# Patient Record
Sex: FEMALE | Race: AMERICAN INDIAN OR ALASKA NATIVE
[De-identification: names, ages, dates, MRNs, and addresses within clinical notes are randomized per-mention and may not be internally consistent; named-entity substitution may affect disease eponyms.]

---

## 2017-08-22 ENCOUNTER — HOSPITAL ENCOUNTER (EMERGENCY)
Dept: HOSPITAL 42 - ED | Age: 42
Discharge: HOME | End: 2017-08-22
Payer: COMMERCIAL

## 2017-08-22 VITALS
OXYGEN SATURATION: 98 % | SYSTOLIC BLOOD PRESSURE: 125 MMHG | RESPIRATION RATE: 19 BRPM | DIASTOLIC BLOOD PRESSURE: 85 MMHG | HEART RATE: 82 BPM | TEMPERATURE: 98.5 F

## 2017-08-22 VITALS — BODY MASS INDEX: 27.4 KG/M2

## 2017-08-22 DIAGNOSIS — K08.89: ICD-10-CM

## 2017-08-22 DIAGNOSIS — K02.9: Primary | ICD-10-CM

## 2017-08-22 PROCEDURE — 96372 THER/PROPH/DIAG INJ SC/IM: CPT

## 2017-08-22 PROCEDURE — 99283 EMERGENCY DEPT VISIT LOW MDM: CPT

## 2017-08-22 NOTE — ED PDOC
Arrival/HPI





- General


Chief Complaint: Dental Pain


Time Seen by Provider: 08/22/17 11:31


Historian: Patient





- History of Present Illness


Narrative History of Present Illness (Text): 





08/22/17 11:32


43 y/o female, no significant pmh, allergic to tylenol and tramadol?, c/o rt. 

lower molar pain x 2 days.  Aching pain, aggravated by chewing and drinking 

cold water, aching pain, no facial swelling, no fever or chills, no neck pain, 

no difficulty turning the neck, no other medical or psychological complaints. 





Past Medical History





- Provider Review


Nursing Documentation Reviewed: Yes





- Infectious Disease


Hx of Infectious Diseases: None





- Cardiac


Hx Cardiac Disorders: No





- Pulmonary


Hx Respiratory Disorders: No





- Neurological


Hx Neurological Disorder: No





- HEENT


Hx HEENT Disorder: No





- Renal


Hx Renal Disorder: No





- Endocrine/Metabolic


Hx Endocrine Disorders: Yes


Hx Hyperthyroidism: Yes





- Hematological/Oncological


Hx Blood Disorders: Yes


Hx Anemia: Yes


Hx Blood Transfusions: No





- Integumentary


Hx Dermatological Disorder: No





- Musculoskeletal/Rheumatological


Hx Musculoskeletal Disorders: Yes





- Gastrointestinal


Hx Gastrointestinal Disorders: No





- Genitourinary/Gynecological


Hx Genitourinary Disorders: No





- Psychiatric


Hx Psychophysiologic Disorder: No


Hx Anxiety: No


Hx Bipolar Disorder: No


Hx Depression: No


Hx Emotional Abuse: No


Hx Hallucinations: No


Hx Panic Disorder: No


Hx Post Traumatic Stress Disorder: No


Hx Psychosis: No


Hx Physical Abuse: No


Hx Schizophrenia: No


Hx Sexual Abuse: No


Hx Substance Use: No





- Anesthesia


Hx Anesthesia: No


Hx Anesthesia Reactions: No


Hx Malignant Hyperthermia: No





Family/Social History





- Physician Review


Nursing Documentation Reviewed: Yes


Family/Social History: Unknown Family HX


Smoking Status: Former Smoker


Hx Alcohol Use: Yes


Frequency of alcohol use: Socially


Hx Substance Use: No





Allergies/Home Meds


Allergies/Adverse Reactions: 


Allergies





acetaminophen [From Darvocet-N] Allergy (Verified 08/22/17 11:25)


 ANAPHYLAXIS


propoxyphene napsylate [From Darvocet-N] Allergy (Verified 08/22/17 11:25)


 ANAPHYLAXIS


tramadol Allergy (Verified 08/22/17 11:25)


 ANAPHYLAXIS











Review of Systems





- Review of Systems


Constitutional: absent: Fatigue, Fevers


Eyes: absent: Vision Changes


ENT: Other (dental pain).  absent: Hearing Changes, Rhinorrhea, Epistaxis


Respiratory: absent: SOB, Cough


Cardiovascular: absent: Chest Pain


Gastrointestinal: absent: Abdominal Pain, Diarrhea, Nausea, Vomiting


Musculoskeletal: absent: Arthralgias, Back Pain


Skin: absent: Rash, Pruritis





Physical Exam


Vital Signs Reviewed: Yes


Vital Signs











  Temp Pulse Resp BP Pulse Ox


 


 08/22/17 11:28  98.5 F  82  19  125/85  98











Temperature: Afebrile


Blood Pressure: Normal


Pulse: Regular


Respiratory Rate: Normal


Appearance: Positive for: Well-Appearing, Non-Toxic


Pain Distress: Severe


Mental Status: Positive for: Alert and Oriented X 3





- Systems Exam


Head: Present: Atraumatic, Normocephalic


Pupils: Present: PERRL


Extroacular Muscles: Present: EOMI


Conjunctiva: Present: Normal


Mouth: Present: Moist Mucous Membranes, Normal Lips, Normal Tounge, Other (

visible rt. lower molar dental caries with brown cracked discoloration, no 

gingival abscess, no gingivitis. ).  No: Drooling


Pharnyx: No: ERYTHEMA, EXUDATE, TONSILS ENLARGED


Neck: Present: Normal Range of Motion


Respiratory/Chest: Present: Clear to Auscultation, Good Air Exchange.  No: 

Respiratory Distress, Accessory Muscle Use


Cardiovascular: Present: Regular Rate and Rhythm, Normal S1, S2.  No: Murmurs


Abdomen: Present: Normal Bowel Sounds.  No: Tenderness, Distention, Peritoneal 

Signs


Back: Present: Normal Inspection


Upper Extremity: Present: Normal Inspection.  No: Cyanosis, Edema


Lower Extremity: Present: Normal Inspection.  No: Edema


Neurological: Present: GCS=15, Speech Normal, Motor Func Grossly Intact, Gait 

Normal, Memory Normal


Skin: Present: Warm, Dry, Normal Color.  No: Rashes


Psychiatric: Present: Alert, Oriented x 3, Normal Insight, Normal Concentration





Medical Decision Making


ED Course and Treatment: 





08/22/17 11:45


-toradol/visicous lidocaine/amoxicillin


-Discharge home with amoxicillin, naproxen, viscous lidocaine, soft food diet, 

stay hydrated, bed rest, follow up with your own pmd and dentist within 2 days, 

return to the ER for any new or worsening signs or symptoms. 








- Medication Orders


Current Medication Orders: 











Discontinued Medications





Amoxicillin (Amoxil 500 Mg Cap)  500 mg PO STAT STA


   PRN Reason: Protocol


   Stop: 08/22/17 11:43


Ketorolac Tromethamine (Toradol)  60 mg IM STAT STA


   Stop: 08/22/17 11:43


Lidocaine HCl (Lidocaine 2% Viscous)  15 ml PO STAT STA


   Stop: 08/22/17 11:43











- PA / NP / Resident Statement


MD/DO has reviewed & agrees with the documentation as recorded.





Disposition/Present on Arrival





- Present on Arrival


Any Indicators Present on Arrival: No


History of DVT/PE: No


History of Uncontrolled Diabetes: No


Urinary Catheter: No


History of Decub. Ulcer: No


History Surgical Site Infection Following: None





- Disposition


Have Diagnosis and Disposition been Completed?: Yes


Diagnosis: 


 Dental caries, Pain, dental





Disposition: HOME/ ROUTINE


Disposition Time: 11:33


Patient Plan: Discharge


Condition: GOOD


Additional Instructions: 


-Discharge home with amoxicillin, naproxen, viscous lidocaine, soft food diet, 

stay hydrated, bed rest, follow up with your own pmd and dentist within 2 days, 

return to the ER for any new or worsening signs or symptoms. 


Prescriptions: 


Amoxicillin 500 mg PO TID #24 tab


Lidocaine 2% Viscous 15 ml MM BID PRN #100 ml


 PRN Reason: Other


Naproxen 500 mg PO BID PRN #20 tab


 PRN Reason: Other


Referrals: 


[facility] [Outside] - Follow up with primary


Damian Castillo DMD [Non-Staff] - Follow up with primary


Forms:  [facility] (English), WORK NOTE

## 2017-09-03 ENCOUNTER — HOSPITAL ENCOUNTER (EMERGENCY)
Dept: HOSPITAL 14 - H.ER | Age: 42
Discharge: HOME | End: 2017-09-03
Payer: COMMERCIAL

## 2017-09-03 VITALS
HEART RATE: 86 BPM | RESPIRATION RATE: 18 BRPM | OXYGEN SATURATION: 99 % | SYSTOLIC BLOOD PRESSURE: 140 MMHG | TEMPERATURE: 98.5 F | DIASTOLIC BLOOD PRESSURE: 65 MMHG

## 2017-09-03 VITALS — BODY MASS INDEX: 27.4 KG/M2

## 2017-09-03 DIAGNOSIS — E05.90: ICD-10-CM

## 2017-09-03 DIAGNOSIS — K08.89: Primary | ICD-10-CM

## 2017-09-03 NOTE — ED PDOC
HPI: Dental Pain/Injury


Time Seen by Provider: 09/03/17 12:42


Chief Complaint (Nursing): Dental Pain


Chief Complaint (Provider): Right lower mouth pain 


History Per: Patient


History/Exam Limitations: no limitations


Onset/Duration Of Symptoms: Days


Current Symptoms Are (Timing): Still Present


Quality: "Pain"


Additional Complaint(s): 


The patient is a 43 yo female, presents to the ED for evaluation of continued 

right lower mouth pain present for the past week. Patient reports initially for 

her symptoms, she visited [facility] and was give antibiotics and 

informed to follow up with a dentist. Patient states she followed up with a 

dentist and was then told to go to an oral surgeon. Patient presents today due 

to persistent pain and states she has not been able to find an oral surgeon. 

Patient also states she has not finished the antibiotics course prescribed to 

her during her [place] visit. She reports taking Naprosyn for pain with minimal 

relief. Of note, contrary to patient's prior records, she states she is not 

allergic to acetaminophen. Patient offers no additional medical complaints. 





Past Medical History


Reviewed: Historical Data, Nursing Documentation, Vital Signs


Vital Signs: 


 Last Vital Signs











Temp  98.5 F   09/03/17 12:41


 


Pulse  86   09/03/17 12:41


 


Resp  18   09/03/17 12:41


 


BP  140/65   09/03/17 12:41


 


Pulse Ox  99   09/03/17 12:41














- Medical History


PMH: Anemia, Back Problems, Hyperthyroidism


   Denies: Anxiety, Bipolar Disorder, Depression, Personality Disorder, Post 

Traumatic Stress Disorder, Chronic Kidney Disease, Schizophrenia





- Surgical History


Surgical History: No Surg Hx





- Family History


Family History: States: Unknown Family Hx





- Immunization History


Hx Tetanus Toxoid Vaccination: No


Hx Influenza Vaccination: No


Hx Pneumococcal Vaccination: No





- Home Medications


Home Medications: 


 Ambulatory Orders











 Medication  Instructions  Recorded


 


Amoxicillin 500 mg PO TID #24 tab 08/22/17


 


Lidocaine 2% Viscous 15 ml MM BID PRN #100 ml 08/22/17


 


Naproxen 500 mg PO BID PRN #20 tab 08/22/17














- Allergies


Allergies/Adverse Reactions: 


 Allergies











Allergy/AdvReac Type Severity Reaction Status Date / Time


 


acetaminophen Allergy  ANAPHYLAXIS Verified 08/22/17 11:25





[From Darvocet-N]     


 


propoxyphene napsylate Allergy  ANAPHYLAXIS Verified 08/22/17 11:25





[From Darvocet-N]     


 


tramadol Allergy  ANAPHYLAXIS Verified 08/22/17 11:25














Review of Systems


ROS Statement: Except As Marked, All Systems Reviewed And Found Negative


Constitutional: Negative for: Fever, Chills


ENT: Positive for: Mouth Pain, Mouth Swelling





Physical Exam





- Reviewed


Nursing Documentation Reviewed: Yes


Vital Signs Reviewed: Yes





- Physical Exam


Appears: Positive for: Well, Non-toxic, No Acute Distress


Head Exam: Positive for: ATRAUMATIC, NORMAL INSPECTION, NORMOCEPHALIC


Skin: Positive for: Normal Color, Dry


Eye Exam: Positive for: Normal appearance


ENT: Positive for: Other (right lower jaw soft tissue swelling; no signs of 

abscess noted)


Neck: Positive for: Normal, Supple


Respiratory: Negative for: Respiratory Distress


Neurologic/Psych: Positive for: Alert, Oriented.  Negative for: Motor/Sensory 

Deficits





- ECG


O2 Sat by Pulse Oximetry: 99 (RA)


Pulse Ox Interpretation: Normal





Medical Decision Making


Medical Decision Making: 


Time: 1300


Impression: Dental pain


Plan:


-- Patient given prescription for precocet and augmentin. Patient also informed 

on the need to follow up with oral surgeon for further evaluation of her mouth 

pain. Patient expresses understanding and is agreeable with plan. Patient 

stable for d/c home.





Scribe Attestation:


Documented by Dee Seals acting as a scribe for MARCOS Rodriguez


Provider Attestation:


All medical record entries made by the Scribe were at my direction and 

personally dictated by me. I have reviewed the chart and agree that the record 

accurately reflects my personal performance of the history, physical exam, 

medical decision making, and the department course for this patient. I have 

also personally directed, reviewed, and agree with the discharge instructions 

and disposition.





Disposition





- Clinical Impression


Clinical Impression: 


 Dental caries








- Disposition


Disposition: Routine/Home


Disposition Time: 13:40


Condition: STABLE


Additional Instructions: 


Please follow-up with dentist.


Instructions:  Toothache (ED)


Forms:  [facility] (English)

## 2018-08-06 ENCOUNTER — HOSPITAL ENCOUNTER (EMERGENCY)
Dept: HOSPITAL 42 - ED | Age: 43
LOS: 1 days | Discharge: HOME | End: 2018-08-07
Payer: MEDICAID

## 2018-08-06 VITALS — BODY MASS INDEX: 32 KG/M2

## 2018-08-06 VITALS — OXYGEN SATURATION: 100 %

## 2018-08-06 DIAGNOSIS — M79.1: ICD-10-CM

## 2018-08-06 DIAGNOSIS — Z87.891: ICD-10-CM

## 2018-08-06 DIAGNOSIS — R51: Primary | ICD-10-CM

## 2018-08-06 NOTE — ED PDOC
Arrival/HPI





- General


Chief Complaint: Headache


Time Seen by Provider: 08/06/18 22:38


Historian: Patient





- History of Present Illness


Narrative History of Present Illness (Text): 





08/06/18 23:05





A 43 year old female, whose past medical history includes hypothyroidism, 

presents to the Emergency Department with a complaint of headache and myalgias. 

Patient states that her neck, shoulder and back muscles hurt. The patient notes 

that she has been having a headache for over one week. The patient denies fevers

, chills, dizziness, sore throat, cough, chest pain, shortness of breath, 

dyspnea on exertion, abdominal pain, nausea, vomiting, diarrhea, urinary/bowel 

changes or any other complaint. 





Time/Duration: Other (1 1/2 week)


Symptom Onset: Gradual


Symptom Course: Unchanged


Activities at Onset: Rest, Light


Context: Home





Past Medical History





- Provider Review


Nursing Documentation Reviewed: Yes





- Infectious Disease


Hx of Infectious Diseases: None





- Cardiac


Hx Cardiac Disorders: No





- Pulmonary


Hx Respiratory Disorders: No





- Neurological


Hx Neurological Disorder: No





- HEENT


Hx HEENT Disorder: No





- Renal


Hx Renal Disorder: No





- Endocrine/Metabolic


Hx Endocrine Disorders: Yes


Hx Hyperthyroidism: Yes





- Hematological/Oncological


Hx Blood Disorders: Yes


Hx Anemia: Yes





- Integumentary


Hx Dermatological Disorder: No





- Musculoskeletal/Rheumatological


Hx Musculoskeletal Disorders: Yes





- Gastrointestinal


Hx Gastrointestinal Disorders: No





- Genitourinary/Gynecological


Hx Genitourinary Disorders: No





- Psychiatric


Hx Psychophysiologic Disorder: No


Hx Substance Use: No





- Anesthesia


Hx Anesthesia: No


Hx Anesthesia Reactions: No


Hx Malignant Hyperthermia: No





Family/Social History





- Physician Review


Nursing Documentation Reviewed: Yes


Family/Social History: No Known Family HX


Smoking Status: Former Smoker


Hx Alcohol Use: Yes


Hx Substance Use: No





Allergies/Home Meds


Allergies/Adverse Reactions: 


Allergies





acetaminophen [From Darvocet-N] Allergy (Verified 08/06/18 22:36)


 ANAPHYLAXIS


propoxyphene napsylate [From Darvocet-N] Allergy (Verified 08/06/18 22:36)


 ANAPHYLAXIS


tramadol Allergy (Verified 08/06/18 22:36)


 ANAPHYLAXIS








Home Medications: 


 Home Meds











 Medication  Instructions  Recorded  Confirmed


 


Ferrous Sulfate [Feosol] 325 mg PO TID 08/06/18 08/06/18


 


Methimazole [Tapazole] 5 mg PO TID 08/06/18 08/06/18














Review of Systems





- Physician Review


All systems were reviewed & negative as marked: Yes





- Review of Systems


Constitutional: absent: Fevers


Respiratory: absent: SOB, Cough


Cardiovascular: absent: Chest Pain, SHEETS


Gastrointestinal: absent: Abdominal Pain, Stool Changes, Diarrhea, Nausea, 

Vomiting


Genitourinary Female: absent: Urine Output Changes


Musculoskeletal: Myalgias


Neurological: Headache.  absent: Dizziness





Physical Exam


Vital Signs Reviewed: Yes


Vital Signs











  Temp Pulse Resp BP Pulse Ox


 


 08/06/18 22:39  98.9 F  80  18  116/82  100











Temperature: Afebrile


Blood Pressure: Normal


Pulse: Regular


Respiratory Rate: Normal


Appearance: Positive for: Well-Appearing, Non-Toxic, Comfortable


Pain Distress: None


Mental Status: Positive for: Alert and Oriented X 3





- Systems Exam


Head: Present: Atraumatic, Normocephalic


Pupils: Present: PERRL


Extroacular Muscles: Present: EOMI


Conjunctiva: Present: Normal


Ears: Present: NORMAL TM


Mouth: Present: Moist Mucous Membranes


Neck: Present: Normal Range of Motion.  No: Meningeal Signs, MIDLINE TENDERNESS


Respiratory/Chest: Present: Clear to Auscultation, Good Air Exchange.  No: 

Respiratory Distress, Accessory Muscle Use


Cardiovascular: Present: Regular Rate and Rhythm, Normal S1, S2.  No: Murmurs


Abdomen: No: Tenderness, Distention, Peritoneal Signs


Back: Present: Normal Inspection


Upper Extremity: Present: Normal Inspection.  No: Cyanosis, Edema


Lower Extremity: Present: Normal Inspection.  No: Edema


Neurological: Present: GCS=15, CN II-XII Intact, Speech Normal


Skin: Present: Warm, Dry, Normal Color.  No: Rashes


Psychiatric: Present: Alert, Oriented x 3, Normal Insight, Normal Concentration





Medical Decision Making


ED Course and Treatment: 





08/06/18 23:10





Impression:


A 43 year old female presents to the Emergency Department with a complaint of 

headache and neck, shoulder, and back muscle aches. 





Plan:


-- Head CT 


-- Flexeril and Motrin


-- Reassess and disposition








Progress Notes:





CT Head Without Intravenous Contrast


Dictated and Authenticated by: Miguel Beltran MD


08/07/2018 12:07 AM Eastern Time ([place] & [place])


IMPRESSION: No acute intracranial findings.








08/07/18 02:13


On reevaluation the patient feels better and is in no acute distress. I have 

discussed the results and plan with the patient, who expresses understanding. 

Patient given the opportunity to ask question, all questions were answered and 

there is agreement with the plan to discharge the patient home.  Patient is 

stable for discharge. Patient was instructed to follow up with physician/clinic 

in 1-2 days or return if symptoms persist/worsen or new concerning symptoms 

arise. 





- RAD Interpretation


Radiology Orders: 








08/06/18 23:06


HEAD W/O CONTRAST [CT] Stat 














- Medication Orders


Current Medication Orders: 











Discontinued Medications





Cyclobenzaprine HCl (Flexeril)  10 mg PO ONCE ONE


   Stop: 08/06/18 23:08


   Last Admin: 08/06/18 23:57  Dose: 10 mg





Ibuprofen (Motrin Tab)  600 mg PO STAT STA


   Stop: 08/06/18 23:13


   Last Admin: 08/06/18 23:57  Dose: 600 mg











- Scribe Statement


The provider has reviewed the documentation as recorded by the [place]





Natalie Perla





Provider Scribe Attestation:


All medical record entries made by the Scribe were at my direction and 

personally dictated by me. I have reviewed the chart and agree that the record 

accurately reflects my personal performance of the history, physical exam, 

medical decision making, and the department course for this patient. I have 

also personally directed, reviewed, and agree with the discharge instructions 

and disposition.








Disposition/Present on Arrival





- Present on Arrival


Any Indicators Present on Arrival: No


History of DVT/PE: No


History of Uncontrolled Diabetes: No


Urinary Catheter: No


History of Decub. Ulcer: No


History Surgical Site Infection Following: None





- Disposition


Have Diagnosis and Disposition been Completed?: Yes


Diagnosis: 


 Headache, Myalgia





Disposition: HOME/ ROUTINE


Disposition Time: 02:16


Patient Plan: Discharge


Condition: GOOD


Discharge Instructions (ExitCare):  Tension Headache (DC), Muscle and Bone Pain 

(DC)


Additional Instructions: 


Rest/take meds as prescribed/follow up with your doctor this week


Prescriptions: 


Cyclobenzaprine [Cyclobenzaprine HCl] 10 mg PO TID PRN #15 tab


 PRN Reason: Muscle Spasm


Ibuprofen [Motrin] 600 mg PO Q8 PRN #14 tab


 PRN Reason: Pain, Moderate (4-7)


Referrals: 


Rikcey Lyon MD [Primary Care Provider] - Follow up with primary


Forms:  [facility] (English)

## 2018-08-07 VITALS
DIASTOLIC BLOOD PRESSURE: 82 MMHG | RESPIRATION RATE: 16 BRPM | TEMPERATURE: 98 F | HEART RATE: 78 BPM | SYSTOLIC BLOOD PRESSURE: 111 MMHG

## 2018-08-07 NOTE — CT
Date of service: 



08/06/2018



PROCEDURE:  CT HEAD WITHOUT CONTRAST.



HISTORY:

headache



COMPARISON:

None available.



TECHNIQUE:

Axial computed tomography images were obtained through the head/brain 

without intravenous contrast.  



Radiation dose:



Total exam DLP = 811 mGy-cm.



This CT exam was performed using one or more of the following dose 

reduction techniques: Automated exposure control, adjustment of the 

mA and/or kV according to patient size, and/or use of iterative 

reconstruction technique.



FINDINGS:



HEMORRHAGE:

No intracranial hemorrhage. 



BRAIN:

No mass effect or edema.  No atrophy or chronic microvascular 

ischemic changes.



VENTRICLES:

Unremarkable. No hydrocephalus. 



CALVARIUM:

Unremarkable.



PARANASAL SINUSES:

Unremarkable as visualized. No significant inflammatory changes.



MASTOID AIR CELLS:

Unremarkable as visualized. No inflammatory changes.



OTHER FINDINGS:

The report concurs with the preliminary [facility] report



IMPRESSION:

No acute findings